# Patient Record
Sex: MALE | URBAN - METROPOLITAN AREA
[De-identification: names, ages, dates, MRNs, and addresses within clinical notes are randomized per-mention and may not be internally consistent; named-entity substitution may affect disease eponyms.]

---

## 2021-08-03 ENCOUNTER — OFFICE VISIT (OUTPATIENT)
Dept: URBAN - METROPOLITAN AREA CLINIC 3 | Facility: CLINIC | Age: 41
End: 2021-08-03
Payer: COMMERCIAL

## 2021-08-03 DIAGNOSIS — H35.40 UNSPECIFIED PERIPHERAL RETINAL DEGENERATION: ICD-10-CM

## 2021-08-03 DIAGNOSIS — H53.022 REFRACTIVE AMBLYOPIA, LEFT EYE: ICD-10-CM

## 2021-08-03 DIAGNOSIS — H04.123 DRY EYE SYNDROME OF BILATERAL LACRIMAL GLANDS: ICD-10-CM

## 2021-08-03 DIAGNOSIS — H52.223 REGULAR ASTIGMATISM, BILATERAL: Primary | ICD-10-CM

## 2021-08-03 DIAGNOSIS — H33.321 ROUND HOLE OF RETINA OF RIGHT EYE: ICD-10-CM

## 2021-08-03 DIAGNOSIS — H43.813 VITREOUS DEGENERATION, BILATERAL: ICD-10-CM

## 2021-08-03 DIAGNOSIS — H25.13 AGE-RELATED NUCLEAR CATARACT, BILATERAL: ICD-10-CM

## 2021-08-03 PROCEDURE — 99204 OFFICE O/P NEW MOD 45 MIN: CPT | Performed by: OPTOMETRIST

## 2021-08-03 ASSESSMENT — VISUAL ACUITY
OS: 20/30
OD: 20/20

## 2021-08-03 ASSESSMENT — INTRAOCULAR PRESSURE
OD: 18
OS: 19

## 2021-08-03 NOTE — IMPRESSION/PLAN
Impression: Refractive amblyopia, left eye: H53.022. Plan: Stressed the importance of full-time spectacle wear, so that the patient's vision does not regress.

## 2021-08-03 NOTE — IMPRESSION/PLAN
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 Plan: Educated pt. on finding. Discussed stability and signs and symptoms of RD. Pt. expressed understanding. Monitor.

## 2021-08-03 NOTE — IMPRESSION/PLAN
Impression: Dry eye syndrome of bilateral lacrimal glands: H04.123. Plan: Dry eyes account for the patient's complaints. There is no evidence of permanent changes to the cornea. Explained condition does not have a cure and will need artificial tears QID OU for maintenance.

## 2021-08-03 NOTE — IMPRESSION/PLAN
Impression: Vitreous degeneration, bilateral: H43.813. Plan: Reviewed the signs and symptoms of possible retinal detachment (flashes, floaters, 
change in peripheral vision, etc). Advised to contact us immediately for 
any of these or other new symptoms.

## 2021-08-03 NOTE — IMPRESSION/PLAN
Impression: Unspecified peripheral retinal degeneration: H35.40. Plan: risk of retinal tears and detachment discussed. Retinal detachment symptoms discussed, and patient agreed to return immediately if new symptoms develop.

## 2021-08-03 NOTE — IMPRESSION/PLAN
Impression: Age-related nuclear cataract, bilateral: H25.13. Plan: Observe for now without intervention. The patient was advised to 
contact us if any change or worsening of vision.